# Patient Record
Sex: MALE | Race: WHITE | NOT HISPANIC OR LATINO | URBAN - METROPOLITAN AREA
[De-identification: names, ages, dates, MRNs, and addresses within clinical notes are randomized per-mention and may not be internally consistent; named-entity substitution may affect disease eponyms.]

---

## 2018-07-11 ENCOUNTER — APPOINTMENT (OUTPATIENT)
Age: 48
Setting detail: DERMATOLOGY
End: 2018-07-12

## 2018-07-11 VITALS — HEIGHT: 69 IN | WEIGHT: 265 LBS

## 2018-07-11 DIAGNOSIS — L30.9 DERMATITIS, UNSPECIFIED: ICD-10-CM

## 2018-07-11 PROBLEM — L29.8 OTHER PRURITUS: Status: ACTIVE | Noted: 2018-07-11

## 2018-07-11 PROBLEM — L20.84 INTRINSIC (ALLERGIC) ECZEMA: Status: ACTIVE | Noted: 2018-07-11

## 2018-07-11 PROCEDURE — OTHER TREATMENT REGIMEN: OTHER

## 2018-07-11 PROCEDURE — OTHER MIPS QUALITY: OTHER

## 2018-07-11 PROCEDURE — OTHER FOLLOW UP FOR NEXT VISIT: OTHER

## 2018-07-11 PROCEDURE — OTHER COUNSELING: OTHER

## 2018-07-11 PROCEDURE — 99202 OFFICE O/P NEW SF 15 MIN: CPT

## 2018-07-11 ASSESSMENT — LOCATION ZONE DERM
LOCATION ZONE: LEG
LOCATION ZONE: TRUNK

## 2018-07-11 ASSESSMENT — LOCATION DETAILED DESCRIPTION DERM
LOCATION DETAILED: SUPERIOR LUMBAR SPINE
LOCATION DETAILED: RIGHT ANTERIOR DISTAL THIGH
LOCATION DETAILED: LEFT ANTERIOR DISTAL THIGH
LOCATION DETAILED: LEFT PROXIMAL PRETIBIAL REGION
LOCATION DETAILED: RIGHT DISTAL PRETIBIAL REGION

## 2018-07-11 ASSESSMENT — LOCATION SIMPLE DESCRIPTION DERM
LOCATION SIMPLE: RIGHT THIGH
LOCATION SIMPLE: LEFT THIGH
LOCATION SIMPLE: RIGHT PRETIBIAL REGION
LOCATION SIMPLE: LEFT PRETIBIAL REGION
LOCATION SIMPLE: LOWER BACK

## 2018-07-11 ASSESSMENT — SEVERITY ASSESSMENT: SEVERITY: MODERATE

## 2018-07-11 ASSESSMENT — BSA RASH: BSA RASH: 5

## 2018-07-11 ASSESSMENT — PAIN INTENSITY VAS: HOW INTENSE IS YOUR PAIN 0 BEING NO PAIN, 10 BEING THE MOST SEVERE PAIN POSSIBLE?: NO PAIN

## 2018-07-11 NOTE — PROCEDURE: FOLLOW UP FOR NEXT VISIT
Instructions (Optional): Possible biopsy if no improvement
Scheduled For Follow Up In (Optional): 4-6 weeks
Detail Level: Simple

## 2018-07-11 NOTE — PROCEDURE: TREATMENT REGIMEN
Plan: This appears to almost be vascular or ecchymotic. However it is diffuse and patchy. I cannot say exactly what might be causing this dermatitis but it does not look dangerous, contagious, or infectious. Patient is asymptomatic, he will continue mild soap and daily lotion. He will try the samples of Eucrisa and call for a prescription if this is helping, if not I would simply observe for now as this will likely go away. If not or if this worsens patient will call or follow up for biopsy. Low dose Aspirin could be contributing but no need to stop the medication at this time
Detail Level: Zone
Discontinue Regimen: Triamcinolone
Samples Given: Eucrisa BID

## 2018-07-11 NOTE — HPI: RASH
How Severe Is Your Rash?: moderate
Is This A New Presentation, Or A Follow-Up?: Rash
Additional History: Patient states he started having redness about one month ago, it started on the ankles and has spread up the lower legs over the last one month. It is completely asymptomatic, no itching, no burning, no pain. No new medications, no recent surgeries, no history of diabetes. He’s never had a rash like this before